# Patient Record
Sex: FEMALE | Race: WHITE | NOT HISPANIC OR LATINO | Employment: OTHER | URBAN - METROPOLITAN AREA
[De-identification: names, ages, dates, MRNs, and addresses within clinical notes are randomized per-mention and may not be internally consistent; named-entity substitution may affect disease eponyms.]

---

## 2023-05-17 ENCOUNTER — TELEPHONE (OUTPATIENT)
Dept: PRIMARY CARE | Facility: CLINIC | Age: 68
End: 2023-05-17

## 2024-02-14 DIAGNOSIS — M79.641 BILATERAL HAND PAIN: Primary | ICD-10-CM

## 2024-02-14 DIAGNOSIS — M79.642 BILATERAL HAND PAIN: Primary | ICD-10-CM

## 2024-02-15 ENCOUNTER — APPOINTMENT (OUTPATIENT)
Dept: RADIOLOGY | Facility: CLINIC | Age: 69
End: 2024-02-15
Payer: MEDICARE

## 2024-02-15 ENCOUNTER — OFFICE VISIT (OUTPATIENT)
Dept: ORTHOPEDIC SURGERY | Facility: CLINIC | Age: 69
End: 2024-02-15
Payer: MEDICARE

## 2024-02-15 DIAGNOSIS — M19.042 ARTHRITIS OF LEFT HAND: Primary | ICD-10-CM

## 2024-02-15 PROCEDURE — 2500000005 HC RX 250 GENERAL PHARMACY W/O HCPCS: Performed by: ORTHOPAEDIC SURGERY

## 2024-02-15 PROCEDURE — 20600 DRAIN/INJ JOINT/BURSA W/O US: CPT | Performed by: ORTHOPAEDIC SURGERY

## 2024-02-15 PROCEDURE — 99213 OFFICE O/P EST LOW 20 MIN: CPT | Performed by: ORTHOPAEDIC SURGERY

## 2024-02-15 PROCEDURE — 2500000004 HC RX 250 GENERAL PHARMACY W/ HCPCS (ALT 636 FOR OP/ED): Performed by: ORTHOPAEDIC SURGERY

## 2024-02-15 RX ORDER — LIDOCAINE HYDROCHLORIDE 10 MG/ML
0.25 INJECTION INFILTRATION; PERINEURAL
Status: COMPLETED | OUTPATIENT
Start: 2024-02-15 | End: 2024-02-15

## 2024-02-15 RX ORDER — TRIAMCINOLONE ACETONIDE 40 MG/ML
10 INJECTION, SUSPENSION INTRA-ARTICULAR; INTRAMUSCULAR
Status: COMPLETED | OUTPATIENT
Start: 2024-02-15 | End: 2024-02-15

## 2024-02-15 RX ADMIN — LIDOCAINE HYDROCHLORIDE 0.25 ML: 10 INJECTION, SOLUTION INFILTRATION; PERINEURAL at 16:38

## 2024-02-15 RX ADMIN — TRIAMCINOLONE ACETONIDE 10 MG: 40 INJECTION, SUSPENSION INTRA-ARTICULAR; INTRAMUSCULAR at 16:38

## 2024-02-15 NOTE — PROGRESS NOTES
Patient ID: Lidia Jeffery is a 68 y.o. 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     The patient presents in office for acute onset atraumatic pain of left finger DIP joint. She saw a dermatologist who referred her here for a longstanding bump of her left hand which has recently become irritated and red. She has a history of arthritis. She has not tried medication for this but has tried mild activity modifications.     The patient has had a previous steroid injection to her knee and is persistent on trying this with her hand.       Past medical history, medications, allergies, surgical history and review of systems are reviewed and otherwise unchanged when compared to last visit on 1/17/2023.          Examination:     Constitutional: Oriented to person, place, and time.     Appears well-developed and well-nourished.     Head: Normocephalic and atraumatic.     Eyes: Pupils are equal, round, and reactive to light.     Cardiovascular: Intact distal pulses.     Pulmonary/Chest/Breast: Effort normal. No respiratory distress.     Neurological: Alert and oriented to person, place, and time.     Skin: Skin is warm and dry.     Psychiatric: normal mood and affect. Behavior is normal.     Musculoskeletal: Advanced arthritc changes left index finger DIP joint with prominent Heberden node., no nail fold nail abnormalities, tenderness to palpation diffusely around DIP joint         No new images were obtained due to patient refusal.            Impression: Left Finger DIP arthritis         Plan: The patient is not considering surgical treatment at this time and has requested a cortisone injection which may provide some relief.  Follow-up with me as needed.  X-rays left hand at next visit    Procedure Note: Risks and benefits of steroid injection discussed with patient. Risks include but are not limited to: pain, infection, allergic reaction to injected medications, changes in the color  or appearance of the skin near the location site and along lymphatic drainage pathway, lack of response, cartilage damage, ligament / tendon rupture, and glycemic control issues in diabetic patients. Patient expresses understanding of risks and elects to proceed. Verbal consent was provided and a time out procedure was performed to confirm the appropriate injection location. Using aseptic technique 20 mg triamcinolone and 0.5 cc 1% lidocaine were injected into the left index finger DIP joint. The patient tolerated the injection well. Post injection instructions were provided.      S Inj/Asp: L index DIP on 2/15/2024 4:38 PM  Details: 25 G needle, ulnar approach  Medications: 10 mg triamcinolone acetonide 40 mg/mL; 0.25 mL lidocaine 10 mg/mL (1 %)  Outcome: tolerated well, no immediate complications  Procedure, treatment alternatives, risks and benefits explained, specific risks discussed. Consent was given by the patient. Immediately prior to procedure a time out was called to verify the correct patient, procedure, equipment, support staff and site/side marked as required. Patient was prepped and draped in the usual sterile fashion.             Sukumar Leach MD          [facility]     Department of Orthopaedic Surgery     Chief of Hand and Upper Extremity Surgery     [facility]     Scribe Attestation  By signing my name below, LAWSON Tikajuan Mayberry, Scribe   attest that this documentation has been prepared under the direction and in the presence of Sukumar Leach MD.

## 2025-07-06 ENCOUNTER — OFFICE VISIT (OUTPATIENT)
Dept: URGENT CARE | Age: 70
End: 2025-07-06
Payer: MEDICARE

## 2025-07-06 VITALS
HEART RATE: 96 BPM | DIASTOLIC BLOOD PRESSURE: 62 MMHG | SYSTOLIC BLOOD PRESSURE: 102 MMHG | RESPIRATION RATE: 22 BRPM | OXYGEN SATURATION: 98 % | TEMPERATURE: 98.6 F | BODY MASS INDEX: 19.74 KG/M2 | WEIGHT: 115 LBS

## 2025-07-06 DIAGNOSIS — T63.441A BEE STING, ACCIDENTAL OR UNINTENTIONAL, INITIAL ENCOUNTER: Primary | ICD-10-CM

## 2025-07-06 RX ORDER — LEVOTHYROXINE SODIUM 75 UG/1
75 TABLET ORAL DAILY
COMMUNITY

## 2025-07-06 RX ORDER — PREDNISONE 20 MG/1
40 TABLET ORAL DAILY
Qty: 10 TABLET | Refills: 0 | Status: SHIPPED | OUTPATIENT
Start: 2025-07-06 | End: 2025-07-11

## 2025-07-06 RX ORDER — TRIAMCINOLONE ACETONIDE 40 MG/ML
40 INJECTION, SUSPENSION INTRA-ARTICULAR; INTRAMUSCULAR ONCE
Status: COMPLETED | OUTPATIENT
Start: 2025-07-06 | End: 2025-07-06

## 2025-07-06 RX ORDER — FLUTICASONE PROPIONATE 50 MCG
SPRAY, SUSPENSION (ML) NASAL
COMMUNITY

## 2025-07-06 RX ADMIN — TRIAMCINOLONE ACETONIDE 40 MG: 40 INJECTION, SUSPENSION INTRA-ARTICULAR; INTRAMUSCULAR at 19:52

## 2025-07-06 ASSESSMENT — PATIENT HEALTH QUESTIONNAIRE - PHQ9
2. FEELING DOWN, DEPRESSED OR HOPELESS: NOT AT ALL
1. LITTLE INTEREST OR PLEASURE IN DOING THINGS: NOT AT ALL
SUM OF ALL RESPONSES TO PHQ9 QUESTIONS 1 AND 2: 0

## 2025-07-06 ASSESSMENT — ENCOUNTER SYMPTOMS
OCCASIONAL FEELINGS OF UNSTEADINESS: 0
ROS SKIN COMMENTS: BEE STING
DEPRESSION: 0
LOSS OF SENSATION IN FEET: 0

## 2025-07-06 NOTE — PROGRESS NOTES
Subjective   Patient ID: Lidia Jeffery is a 70 y.o. female. They present today with a chief complaint of Insect Bite (Left arm red and swollen).    History of Present Illness  HPI    70-year-old female patient presents today for concerns of a bee sting on her left elbow that happened on July 4th.  She states that it hurt pretty bad when it happened and she came inside and rinsed it off and make sure the stinger was out.  She was told about 10 years ago that she may become allergic to bee stings; she is not having any difficulties breathing, only some localized swelling, itchiness, and pain.  She states that it did keep her up last night due to to how uncomfortable it is.  She has full range of motion.  She is here for evaluation.  Past Medical History  Allergies as of 07/06/2025 - Reviewed 07/06/2025   Allergen Reaction Noted    Alendronate GI Upset 10/03/2023    Penicillins Hives 12/22/2021    Sulfa (sulfonamide antibiotics) Swelling and Rash 03/04/2008       Prescriptions Prior to Admission[1]     Medical History[2]    Surgical History[3]     reports that she has never smoked. She has never used smokeless tobacco. She reports that she does not drink alcohol and does not use drugs.    Review of Systems  Review of Systems   Skin:         Bee sting                                  Objective    Vitals:    07/06/25 1936   BP: 102/62   Pulse: 96   Resp: 22   Temp: 37 °C (98.6 °F)   TempSrc: Oral   SpO2: 98%   Weight: 52.2 kg (115 lb)     No LMP recorded. Patient is postmenopausal.    Physical Exam  Skin:     Findings: Erythema present.                Procedures    Point of Care Test & Imaging Results from this visit  No results found for this visit on 07/06/25.   Imaging  No results found.    Cardiology, Vascular, and Other Imaging  No other imaging results found for the past 2 days      Diagnostic study results (if any) were reviewed by ESHA Lunsford-CNP.    Assessment/Plan   Allergies, medications,  history, and pertinent labs/EKGs/Imaging reviewed by SHENA Lunsford.     Medical Decision Making  Left posterior elbow noted to be swollen and red; no concern for cellulitis at this time.  Patient is agreeable to injection of triamcinolone today and picking up her prescription to start tomorrow of prednisone from her pharmacy; medication education provided. As a result of the work-up, the patient was discharged home.  she was informed of her diagnosis and instructed to come back with any concerns or worsening of condition.  she and was agreeable to the plan as discussed above.  she was given the opportunity to ask questions.  All of the patient's questions were answered.    Orders and Diagnoses  Diagnoses and all orders for this visit:  Bee sting, accidental or unintentional, initial encounter  -     triamcinolone acetonide (Kenalog-40) injection 40 mg  -     predniSONE (Deltasone) 20 mg tablet; Take 2 tablets (40 mg) by mouth once daily for 5 days.      Medical Admin Record      Patient disposition: Home    Electronically signed by SHENA Lunsford  7:48 PM           [1] (Not in a hospital admission)   [2] History reviewed. No pertinent past medical history.  [3] History reviewed. No pertinent surgical history.